# Patient Record
Sex: MALE | Race: WHITE | URBAN - METROPOLITAN AREA
[De-identification: names, ages, dates, MRNs, and addresses within clinical notes are randomized per-mention and may not be internally consistent; named-entity substitution may affect disease eponyms.]

---

## 2023-06-08 ENCOUNTER — OFFICE VISIT (OUTPATIENT)
Facility: LOCATION | Age: 78
End: 2023-06-08
Payer: MEDICARE

## 2023-06-08 DIAGNOSIS — H43.811 VITREOUS DETACHMENT OF RIGHT EYE: ICD-10-CM

## 2023-06-08 DIAGNOSIS — H35.341 MACULAR HOLE OF RT EYE: ICD-10-CM

## 2023-06-08 DIAGNOSIS — H25.12 AGE-RELATED NUCLEAR CATARACT OF LEFT EYE: ICD-10-CM

## 2023-06-08 DIAGNOSIS — H04.123 DRY EYE SYNDROME OF BILATERAL LACRIMAL GLANDS: Primary | ICD-10-CM

## 2023-06-08 DIAGNOSIS — H40.013 OPEN ANGLE WITH BORDERLINE FINDINGS, LOW RISK, BILATERAL: ICD-10-CM

## 2023-06-08 PROCEDURE — 92134 CPTRZ OPH DX IMG PST SGM RTA: CPT | Performed by: OPHTHALMOLOGY

## 2023-06-08 PROCEDURE — 99204 OFFICE O/P NEW MOD 45 MIN: CPT | Performed by: OPHTHALMOLOGY

## 2023-06-08 ASSESSMENT — INTRAOCULAR PRESSURE
OS: 12
OD: 12

## 2023-06-08 ASSESSMENT — VISUAL ACUITY
OS: 20/30
OD: 20/30

## 2023-06-08 NOTE — IMPRESSION/PLAN
Impression: Macular hole of rt eye: H35.341. Examination shows a Lamellar macular hole. It is located only in one spot.   Plan:

## 2023-06-08 NOTE — IMPRESSION/PLAN
Impression: Dry eye syndrome of bilateral lacrimal glands: H04.123. Patients OD is dry. Schrimer test is OD: 8 Plan: Patient to use AT's QID OU.

## 2023-06-08 NOTE — IMPRESSION/PLAN
Impression: Age-related nuclear cataract of left eye: H25.12. Patient currently has no functional problems due to cataracts. Will proceed once patient is functionally bothered. Plan: No cataract surgery indicated in todays visit until patient is bothered.

## 2023-06-08 NOTE — IMPRESSION/PLAN
Impression: Open angle with borderline findings, low risk, bilateral: H40.013. Discussed with patient on the risk of developing glaucoma but currently does not have it. Educated patient on glaucoma being high eye pressure, having side vision loss and enlarged optic nerves. Advised patient to keep all upcoming appointments to prevent permanent visual loss. Patient expressed understanding. Plan: No treatment indicated in today's visit, only observation. RTC in 2 weeks with Dr. Doherty. OCT MAC and VF 24-2.

## 2023-06-22 ENCOUNTER — OFFICE VISIT (OUTPATIENT)
Facility: LOCATION | Age: 78
End: 2023-06-22
Payer: MEDICARE

## 2023-06-22 DIAGNOSIS — H43.811 VITREOUS DETACHMENT OF RIGHT EYE: ICD-10-CM

## 2023-06-22 DIAGNOSIS — H53.462 HOMONYMOUS HEMIANOPIA OF LEFT SIDE: Primary | ICD-10-CM

## 2023-06-22 DIAGNOSIS — H25.12 AGE-RELATED NUCLEAR CATARACT OF LEFT EYE: ICD-10-CM

## 2023-06-22 DIAGNOSIS — H40.013 OPEN ANGLE WITH BORDERLINE FINDINGS, LOW RISK, BILATERAL: ICD-10-CM

## 2023-06-22 DIAGNOSIS — H35.341 MACULAR HOLE OF RT EYE: ICD-10-CM

## 2023-06-22 PROCEDURE — 99214 OFFICE O/P EST MOD 30 MIN: CPT | Performed by: OPHTHALMOLOGY

## 2023-06-22 PROCEDURE — 92083 EXTENDED VISUAL FIELD XM: CPT | Performed by: OPHTHALMOLOGY

## 2023-06-22 PROCEDURE — 92134 CPTRZ OPH DX IMG PST SGM RTA: CPT | Performed by: OPHTHALMOLOGY

## 2023-06-22 ASSESSMENT — INTRAOCULAR PRESSURE
OD: 12
OS: 13

## 2023-06-22 NOTE — IMPRESSION/PLAN
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 Examination shows Homonymous hemianopia OS. VF 24-2 was done today. OD : VF loss, Homonymous hemianopia, Moderate field damage OS :Good, Moderate field Damage. Plan: RTC in 6 months for re eval with Dr. Doherty. VF 24-2 to be done and dilation.

## 2023-06-22 NOTE — IMPRESSION/PLAN
Impression: Open angle with borderline findings, low risk, bilateral: H40.013. Discussed with patient on the risk of developing glaucoma but currently does not have it. Educated patient on glaucoma being high eye pressure, having side vision loss and enlarged optic nerves. Advised patient to keep all upcoming appointments to prevent permanent visual loss. Patient expressed understanding. Plan: No treatment indicated in today's visit, only observation.

## 2023-06-22 NOTE — IMPRESSION/PLAN
Impression: Macular hole of rt eye: H35.341. Examination shows a Lamellar macular hole. It is located only in one spot. Plan: Monitor.

## 2023-06-22 NOTE — IMPRESSION/PLAN
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 Examination shows PVD OD. Explained in depth what PVD is and what has happened. Plan: Monitor.

## 2024-05-17 ENCOUNTER — OFFICE VISIT (OUTPATIENT)
Facility: LOCATION | Age: 79
End: 2024-05-17
Payer: MEDICARE

## 2024-05-17 DIAGNOSIS — H35.341 MACULAR HOLE OF RT EYE: ICD-10-CM

## 2024-05-17 DIAGNOSIS — H53.462 HOMONYMOUS HEMIANOPIA OF LEFT SIDE: Primary | ICD-10-CM

## 2024-05-17 DIAGNOSIS — H25.13 AGE-RELATED NUCLEAR CATARACT, BILATERAL: ICD-10-CM

## 2024-05-17 DIAGNOSIS — H40.013 OPEN ANGLE WITH BORDERLINE FINDINGS, LOW RISK, BILATERAL: ICD-10-CM

## 2024-05-17 PROCEDURE — 99214 OFFICE O/P EST MOD 30 MIN: CPT | Performed by: OPHTHALMOLOGY

## 2024-05-17 ASSESSMENT — INTRAOCULAR PRESSURE
OD: 12
OS: 13